# Patient Record
(demographics unavailable — no encounter records)

---

## 2025-02-07 NOTE — ADDENDUM
[FreeTextEntry1] : Documented by Patty Salazar acting as a scribe for Dr. Kelton Garcia on 02/07/2025. All medical record entries made by the Scribe were at my, Dr. Kelton Garcia's, direction and personally dictated by me on 02/07/2025. I have reviewed the chart and agree that the record accurately reflects my personal performance of the history, physical exam, assessment and plan. I have also personally directed, reviewed, and agree with the discharge instructions.

## 2025-02-07 NOTE — PHYSICAL EXAM
[No Acute Distress] : no acute distress [Normal Oropharynx] : normal oropharynx [III] : Mallampati Class: III [Normal Appearance] : normal appearance [No Neck Mass] : no neck mass [Normal Rate/Rhythm] : normal rate/rhythm [Normal S1, S2] : normal s1, s2 [No Murmurs] : no murmurs [No Resp Distress] : no resp distress [Benign] : benign [No Abnormalities] : no abnormalities [Normal Gait] : normal gait [No Clubbing] : no clubbing [No Cyanosis] : no cyanosis [No Edema] : no edema [FROM] : FROM [Normal Color/ Pigmentation] : normal color/ pigmentation [No Focal Deficits] : no focal deficits [Oriented x3] : oriented x3 [Normal Affect] : normal affect [TextBox_2] : OW [TextBox_68] : I:E ratio 1:3; mild expiratory wheeze

## 2025-02-07 NOTE — REASON FOR VISIT
[Follow-Up] : a follow-up visit [TextBox_44] : anxiety, overweight, chronic cough, asthma (allergic / eosinophilic), GERD, allergies

## 2025-02-07 NOTE — HISTORY OF PRESENT ILLNESS
[FreeTextEntry1] : Ms. Schmid is a 45-year-old female with a history of anxiety, overweight, chronic cough, asthma (allergic / eosinophilic), GERD, allergies who presents for a follow-up pulmonary evaluation. Her chief complaint is  -she notes s/p 2nd root canal since the winter of 2024 -she notes sinus headaches with the rain/prolonged wet seasons -she notes bowels are regular  -she notes she has an eye doctor appointment in 2 weeks -she notes occasional heartburn/reflux due to eating a large meal before bed -she's no longer waking up with vomit in her mouth -she notes she uses 2 nasal sprays, 2 allergy medications, and Trelegy in the morning -she notes she takes Pepcid medications at night and an allergy medicine -she notes her cough is controlled with nasal sprays, Trelegy, and Halls -she notes with prolonged humidity or the change in seasons, her cough comes on  -she denies any nausea, fever, chills, sweats, chest pain, chest pressure, palpitations, diarrhea, constipation, dysphagia, vertigo, arthralgias, myalgias, leg swelling, itchy eyes, itchy ears, or sour taste in the mouth.

## 2025-02-07 NOTE — ASSESSMENT
[FreeTextEntry1] : Ms. Schmid is a 44 y/o female with a history of anxiety, Abnormal CT, overweight, chronic cough, asthma (allergic / eosinophilic), GERD, allergies- stable, s/p thyroid surgery (8/2019); neuralgia 10/202; s/p covid-19 infection 12/2021- [place] Covid resolved-; COVID-19 10/2022- mild asthma sx (s/p URI/ allergy season)- mild allergy Sx/poor sleep, low energy (Depression)- mild asthma (weather)  Her SOB is multifactorial: (controlled) -overweight -out of shape -poor breathing mechanics -asthma  Her chronic cough is multifactorial: (controlled) -PND -asthma -LPR/GERD  problem 1: asthma (semi-active) - continue Trelegy 200) 1 puff QD -continue Singulair 10mg QHS -continue ProAir pre-exercise 2 puffs q6h or nebulizer (Xopenex 0.63 via nebulizer q6H prn) -s/p blood work: eosinophil level, IgE level, food IgE panel, vitamin D level (not received yet) from 3/18  -Inhaler technique reviewed as well as oral hygiene techniques reviewed with patient. Avoidance of cold air, extremes of temperature, rescue inhaler should be used before exercise. Order of medication reviewed with patient. Recommended use of a cool mist humidifier in the bedroom. -Asthma is believed to be caused by inherited (genetic) and environmental factor, but its exact cause is unknown. Asthma may be triggered by allergens, lung infections, or irritants in the air. Asthma triggers are different for each person.  problem 1A: [place] Covid-19 12/2021, 10/2022- resolved -s/p PPI AM/ Pepcid 40 mg QHS - s/p Benadryl 25 qHS  problem 2: allergies/PND (semi active) - Dr. Stone -recommended Sinugator -continue Nasonex 1 sniff each nostril BID -followed by olopatadine 0.6% 1 sniff each nostril BID -continue Clarinex 5 mg QAM -continue Xyzal 5 mg QHS  -continue Olopatadine/Zaditor eye drops  Environmental measures for allergies were encouraged including mattress and pillow cover, air purifier, and environmental controls.  problem 3: overweight (on Weight Watchers) - Justin -recommended Berberine OTC supplement for visceral fat loss  -recommended to read "Bright Spots [employer]ines" , Engine 2" and "Obesity Code" - "NOOM" Weight loss, exercise, and diet control were discussed and are highly encouraged. Treatment options were given such as, aqua therapy, and contacting a nutritionist. Recommended to use the elliptical, stationary bike, less use of treadmill. Mindful eating was explained to the patient Obesity is associated with worsening asthma, shortness of breath, and potential for cardiac disease, diabetes, and other underlying medical conditions.  problem 4: poor breathing mechanics -Recommended Wim Hof and Buteyko breathing techniques -Proper breathing techniques were reviewed with an emphasis of exhalation. Patient instructed to breath in for 1 second and out for four seconds. Patient was encouraged to not talk while walking.  problem 5: LPR/GERD- controlled -Continue Pepcid 40 mg BID -continue Protonix 40 mg QAM, pre-breakfast -Recommended to chew on DGL (diglyceride licorice root) before breakfast and dinner -Recommended to take Slippery Elm or Slippery Elm Tea -Rule of 2's- Avoid eating too much, too late, too poorly, too spicy, or two hours before bed -Things to avoid including overeating, spicy foods, tight clothing, eating within three hours of bed, this list is not all inclusive. -For treatment of reflux, possible options discussed including diet control, H2 blockers, PPIs, as well as coating motility agents discussed as treatment options. Timing of meals and proximity of last meal to sleep were discussed. If symptoms persist, a formal gastrointestinal evaluation is needed.  problem 6: low vitamin D -continue supplemental vitamin D -Has been associated with asthma exacerbations and increased allergic symptoms. The goal based on recent information is maintaining levels between 50-70 and low normal is 30. Recommended 50,000 units every two weeks to once a month depending on the level.  problem 7: poor sleep; RLS / snoring (primary) - continue OxyAid / Somnifix/Nozovent -No evidence of sleep apnea by at home sleep study -continue Requip 0.5 mg QHS -recommended Neuro-Mag  Restless Legs Syndrome (RLS), also known as Simpson-Ekbom Disease, is a common sleep -related movement disorder. About 1 in 10 adults in the [place] have problems from restless leg syndrome. It also can be seen in about 2% of children. Women are twice as likely as men to have RLS. People with RLS will have symptoms most often during times when they are less active, especially at bedtime. RLS most often causes an overwhelming urge to move your legs and sometimes other parts of your body. This urge is associated with unpleasant sensations in different parts of th body. The symptoms can be mild to severe and can affect your ability to go to sleep and stay asleep. People with RLS often sleep less at night and feel more tired during the day. -Good sleep hygiene was encouraged including avoiding watching television an hour before bed, keeping caffeine at a low, avoiding reading, television, or anything, in bed, no drinking any liquids three hours before bedtime, and only getting into bed when tired and ready for sleep.  problem 8: abnormal CT (thyroid) -c/w substernal thyroid therefore she is to have an endocrine evaluation (Dr. Kaylynn Padilla) -She is to repeat CT in 1 year- March 2019 (overdue) - will complete now (overdue) CAT scans are the only radiological modality to identify abnormalities w/in the lungs with regards to nodules/masses/lymph nodes. Risks, benefits were reviewed in detail. The guidelines for abnormalities include follow up CT scans at various intervals which could range from 6 weeks to 1 year intervals. If there is a change for the worse then consideration for a biopsy will be considered if you are a candidate. Second opinion evaluation with a thoracic surgeon or an interventional radiologist could be offered.  problem 9: eosinophilic asthma -She is a candidate for Nucala / Fasenra / Dupixent and she is being encouraged to implement. Literature provided -Eosinophils play a role in regulating health, including regulating the immune system, regenerating and repairing tissues, and host protection (e.g. defending the body against parasitic infection).  -In severe asthma, too many eosinophils are a key  of asthma pathophysiology, including airway inflammation. Too many airway tissue eosinophils are associated with increased symptoms, likelihood of exacerbations, and airway inflammation.   Problem 10: Health Maintenance/COVID19 Precautions: s/p COVID-19 vaccine x2 ; pfizer 5/2021 - no booster recommended at this time - recommended Neuro Renew - recommended Quercetin 2000 mg - recommended SPM  -Covid 19 precaution, vaccine and booster discussed at length and recommended - Clean your hands often. Wash your hands often with soap and water for at least 20 seconds, especially after blowing your nose, coughing, or sneezing, or having been in a public place. - If soap and water are not available, use a hand  that contains at least 60% alcohol. - To the extent possible, avoid touching high-touch surfaces in public places - elevator buttons, door handles, handrails, handshaking with people, etc. Use a tissue or your sleeve to cover your hand or finger if you must touch something. - Wash your hands after touching surfaces in public places. - Avoid touching your face, nose, eyes, etc. - Clean and disinfect your home to remove germs: practice routine cleaning of frequently touched surfaces (for example: tables, doorknobs, light switches, handles, desks, toilets, faucets, sinks & cell phones) - Avoid crowds, especially in poorly ventilated spaces. Your risk of exposure to respiratory viruses like COVID-19 may increase in crowded, closed-in settings with little air circulation if there are people in the crowd who are sick. All patients are recommended to practice social distancing and stay at least 6 feet away from others. - Avoid all non-essential travel including plane trips, and especially avoid embarking on cruise ships. -If COVID-19 is spreading in your community, take extra measures to put distance between yourself and other people to further reduce your risk of being exposed to this new virus. -Stay home as much as possible. - Consider ways of getting food brought to your house through family, social, or commercial networks -Be aware that the virus has been known to live in the air up to 3 hours post exposure, cardboard up to 24 hours post exposure, copper up to 4 hours post exposure, steel and plastic up to 2-3 days post exposure. Risk of transmission from these surfaces are affected by many variables. Immune Support Recommendations: -OTC Vitamin C 500mg BID -OTC Quercetin 250-500mg BID -OTC Zinc 75-100mg per day -OTC Melatonin 1 or 2 mg a night -OTC Vitamin D 1-4000mg per day -OTC Tonic Water 8oz per day Asthma and COVID19: You need to make sure your asthma is under control. This often requires the use of inhaled corticosteroids (and sometimes oral corticosteroids). Inhaled corticosteroids do not likely reduce your immune system's ability to fight infections, but oral corticosteroids may. It is important to use the steps above to protect yourself to limit your exposure to any respiratory virus.  problem 11: health maintenance -complete Psych follow-up - Wellbutrin / Trazodone -recommended regular stretching with breathing techniques as instructed in office (back pain) -Recommended to use Michael Kaplan -recommend book: "The Gift of Maybe" -recommended to read "The Body Keeps the Score" -recommended the book "Hidden Potential" -recommended a yearly flu shot 2024 -recommended strep pneumonia vaccines: Prevnar-13 vaccine, followed by Pneumo vaccine 23 on year following -recommended early intervention for URIs -recommended osteoporosis evaluations -recommended early dermatological evaluations -recommended after the age of 50 to the age of 70, colonoscopy every 5 years -encouraged early intervention  Follow up in 4 months with ADRIENNE and Brandt The patient was encouraged to call with any changes, concerns, or questions.

## 2025-02-07 NOTE — PROCEDURE
[FreeTextEntry1] : PFTs revealed normal flows; FEV1 was 2.51L, which is 91.3% of predicted; normal flow volume loop. PFTs were performed to evaluate for asthma  FENO was 16; a normal value being less than 25 Fractional exhaled nitric oxide (FENO) is regarded as a simple, noninvasive method for assessing eosinophilic airway inflammation. Produced by a variety of cells within the lung, nitric oxide (NO) concentrations are generally low in healthy individuals. However, high concentrations of NO appear to be involved in nonspecific host defense mechanisms and chronic inflammatory diseases such as asthma. The American Thoracic Society (ATS) therefore has recommended using FENO to aid in the diagnosis and monitoring of eosinophilic airway inflammation and asthma, and for identifying steroid responsive individuals whose chronic respiratory symptoms may be caused by airway inflammation.